# Patient Record
Sex: FEMALE | Race: WHITE | NOT HISPANIC OR LATINO | URBAN - METROPOLITAN AREA
[De-identification: names, ages, dates, MRNs, and addresses within clinical notes are randomized per-mention and may not be internally consistent; named-entity substitution may affect disease eponyms.]

---

## 2019-02-16 ENCOUNTER — EMERGENCY (EMERGENCY)
Facility: HOSPITAL | Age: 39
LOS: 0 days | Discharge: HOME | End: 2019-02-16
Attending: EMERGENCY MEDICINE
Payer: COMMERCIAL

## 2019-02-16 VITALS — WEIGHT: 130.07 LBS | HEIGHT: 65 IN

## 2019-02-16 VITALS — HEART RATE: 97 BPM

## 2019-02-16 DIAGNOSIS — M65.4 RADIAL STYLOID TENOSYNOVITIS [DE QUERVAIN]: ICD-10-CM

## 2019-02-16 DIAGNOSIS — M25.532 PAIN IN LEFT WRIST: ICD-10-CM

## 2019-02-16 PROCEDURE — 99283 EMERGENCY DEPT VISIT LOW MDM: CPT

## 2019-02-16 RX ORDER — IBUPROFEN 200 MG
600 TABLET ORAL ONCE
Qty: 0 | Refills: 0 | Status: COMPLETED | OUTPATIENT
Start: 2019-02-16 | End: 2019-02-16

## 2019-02-16 RX ADMIN — Medication 600 MILLIGRAM(S): at 16:38

## 2019-02-16 RX ADMIN — Medication 50 MILLIGRAM(S): at 16:38

## 2019-02-16 NOTE — ED STATDOCS - PROGRESS NOTE DETAILS
signed Michelle Sauer PA-C Pt seen initially in intake by Dr Story. signed Michelle Sauer PA-C Pt seen initially in intake by Dr Story.  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 + FInkelstein test. 2+ radial pluse. Gross motor/sensation intact. LIkely dequervain tenosynovitis. Pt has velcro wrist brace already. WIll send rx for prednisone, recommend f.u with hand [facility]in this week. Pt feeling well, pt and family agree with DC and plan of care. NWB.

## 2019-02-16 NOTE — ED STATDOCS - CLINICAL SUMMARY MEDICAL DECISION MAKING FREE TEXT BOX
likely de quervain tenosynovitis x 1 day. outpt f/u hand/Ellstein, rx prednisone, velcro wrist brace.

## 2019-02-16 NOTE — ED STATDOCS - OBJECTIVE STATEMENT
37 y/o female with a PMHx of neuropathy, hereditary presents to the ED c/o progressively worsening left wrist pain and swelling since yesterday. Pt reports she has h/o hereditary neuropathy. Woke up yesterday with pain and swelling. Worse with movement of left wrist. Denies any trauma. Denies fever, chills, redness to site. No other acute complaints at this time. No medications.

## 2019-02-16 NOTE — ED ADULT NURSE NOTE - NSIMPLEMENTINTERV_GEN_ALL_ED
Implemented All Universal Safety Interventions:  [place] to call system. Call bell, personal items and telephone within reach. Instruct patient to call for assistance. Room bathroom lighting operational. Non-slip footwear when patient is off stretcher. Physically safe environment: no spills, clutter or unnecessary equipment. Stretcher in lowest position, wheels locked, appropriate side rails in place.

## 2019-02-16 NOTE — ED STATDOCS - MUSCULOSKELETAL, MLM
+Mild left wrist edema with no overlying erythema. +Positive Finkelstein's test of left wrist. No bony tenderness. Nml ROM.

## 2019-12-16 PROBLEM — G62.9 POLYNEUROPATHY, UNSPECIFIED: Chronic | Status: ACTIVE | Noted: 2019-02-16

## 2019-12-23 ENCOUNTER — APPOINTMENT (OUTPATIENT)
Dept: OTOLARYNGOLOGY | Facility: CLINIC | Age: 39
End: 2019-12-23
Payer: COMMERCIAL

## 2019-12-23 VITALS — HEIGHT: 64 IN | WEIGHT: 129 LBS | BODY MASS INDEX: 22.02 KG/M2

## 2019-12-23 DIAGNOSIS — J30.9 ALLERGIC RHINITIS, UNSPECIFIED: ICD-10-CM

## 2019-12-23 DIAGNOSIS — J32.2 CHRONIC ETHMOIDAL SINUSITIS: ICD-10-CM

## 2019-12-23 PROCEDURE — 99244 OFF/OP CNSLTJ NEW/EST MOD 40: CPT | Mod: 25

## 2019-12-23 PROCEDURE — 31231 NASAL ENDOSCOPY DX: CPT

## 2019-12-23 NOTE — REVIEW OF SYSTEMS
[Sneezing] : sneezing [Problem Snoring] : problem snoring [Nasal Congestion] : nasal congestion [Sinus Pain] : sinus pain [Sinus Pressure] : sinus pressure [Swelling Neck] : swelling neck [Snoring With Pauses] : snoring with pauses [Swelling Face] : face swelling [Eye Pain] : eye pain [Cough] : cough [Eyes Itch] : itching of the eyes [Heartburn] : heartburn [Joint Pain] : joint pain [Muscle Weakness] : muscle weakness [Anxiety] : anxiety [Negative] : Heme/Lymph [Patient Intake Form Reviewed] : Patient intake form was reviewed [FreeTextEntry1] : noisy breathing, daytime sleepiness,

## 2019-12-23 NOTE — ASSESSMENT
[FreeTextEntry1] : persistent rt periorbital and cheek pressure \par rt nasal obstruction\par ?sinusitis\par cefdinir 300 bid #20\par fluticasone spray\par consider ct sinuses if symptoms do not resolve

## 2019-12-23 NOTE — PROCEDURE
[FreeTextEntry6] : Indication for procedure:  Unable to adequately examine mid and posterior nasal cavity with anterior rhinoscopy\par Sinus endoscope # 1\par Nasal septum exam shows septal deviation to the rt 2-3 plus\par The inferior nasal turbinates are moderately hypertrophic in size bilaterally.\par The sinus endoscope was introduced into the right nares\par exam right middle meatus reveals no mucopus or inflammation.  The right middle turbinate is WNL.\par The scope was advanced and the sphenoethmoid region was inspected.\par The superior meatus, superior turbinate and nasal vault are unremarkable.  The nasopharynx is unremarkable without inflammation or mass.\par The sinus endoscope was introduced into the left nares and\par exam left middle meatus reveals no mucopus or inflammation.  The left middle turbinate is WNL.\par The scope was advanced and the sphenoethmoid region was inspected.\par The left superior meatus and nasal vault are unremarkable.\par

## 2019-12-23 NOTE — CONSULT LETTER
[Sincerely,] : Sincerely, [FreeTextEntry3] : Lucien Almaguer MD, FACS\par  [FreeTextEntry1] : Dear Dr. KATIE CMCARTHY,\par \par Thank you for your kind referral. Please refer to my enclosed office notes for ALEKS RUTLEDGE . If there are any questions free to contact me.\par

## 2020-06-03 ENCOUNTER — OUTPATIENT (OUTPATIENT)
Dept: OUTPATIENT SERVICES | Facility: HOSPITAL | Age: 40
LOS: 1 days | End: 2020-06-03
Payer: COMMERCIAL

## 2020-06-03 ENCOUNTER — APPOINTMENT (OUTPATIENT)
Dept: CT IMAGING | Facility: CLINIC | Age: 40
End: 2020-06-03
Payer: COMMERCIAL

## 2020-06-03 DIAGNOSIS — Z00.8 ENCOUNTER FOR OTHER GENERAL EXAMINATION: ICD-10-CM

## 2020-06-03 PROCEDURE — 70486 CT MAXILLOFACIAL W/O DYE: CPT | Mod: 26

## 2020-06-03 PROCEDURE — 70486 CT MAXILLOFACIAL W/O DYE: CPT

## 2021-12-13 ENCOUNTER — APPOINTMENT (OUTPATIENT)
Dept: FAMILY MEDICINE | Facility: CLINIC | Age: 41
End: 2021-12-13
Payer: COMMERCIAL

## 2021-12-13 VITALS — BODY MASS INDEX: 20.14 KG/M2 | WEIGHT: 118 LBS | HEIGHT: 64 IN

## 2021-12-13 DIAGNOSIS — Z78.9 OTHER SPECIFIED HEALTH STATUS: ICD-10-CM

## 2021-12-13 DIAGNOSIS — Z82.49 FAMILY HISTORY OF ISCHEMIC HEART DISEASE AND OTHER DISEASES OF THE CIRCULATORY SYSTEM: ICD-10-CM

## 2021-12-13 DIAGNOSIS — Z80.0 FAMILY HISTORY OF MALIGNANT NEOPLASM OF DIGESTIVE ORGANS: ICD-10-CM

## 2021-12-13 DIAGNOSIS — L70.0 ACNE VULGARIS: ICD-10-CM

## 2021-12-13 DIAGNOSIS — K29.70 GASTRITIS, UNSPECIFIED, W/OUT BLEEDING: ICD-10-CM

## 2021-12-13 DIAGNOSIS — L30.9 DERMATITIS, UNSPECIFIED: ICD-10-CM

## 2021-12-13 DIAGNOSIS — H04.129 DRY EYE SYNDROME OF UNSPECIFIED LACRIMAL GLAND: ICD-10-CM

## 2021-12-13 DIAGNOSIS — K21.9 GASTRO-ESOPHAGEAL REFLUX DISEASE W/OUT ESOPHAGITIS: ICD-10-CM

## 2021-12-13 DIAGNOSIS — G56.03 CARPAL TUNNEL SYNDROM,BILATERAL UPPER LIMBS: ICD-10-CM

## 2021-12-13 DIAGNOSIS — D50.9 IRON DEFICIENCY ANEMIA, UNSPECIFIED: ICD-10-CM

## 2021-12-13 DIAGNOSIS — Z82.3 FAMILY HISTORY OF STROKE: ICD-10-CM

## 2021-12-13 DIAGNOSIS — Z80.42 FAMILY HISTORY OF MALIGNANT NEOPLASM OF PROSTATE: ICD-10-CM

## 2021-12-13 DIAGNOSIS — H18.9 UNSPECIFIED DISORDER OF CORNEA: ICD-10-CM

## 2021-12-13 DIAGNOSIS — Z00.00 ENCOUNTER FOR GENERAL ADULT MEDICAL EXAMINATION W/OUT ABNORMAL FINDINGS: ICD-10-CM

## 2021-12-13 DIAGNOSIS — M94.0 CHONDROCOSTAL JUNCTION SYNDROME [TIETZE]: ICD-10-CM

## 2021-12-13 DIAGNOSIS — Z80.1 FAMILY HISTORY OF MALIGNANT NEOPLASM OF TRACHEA, BRONCHUS AND LUNG: ICD-10-CM

## 2021-12-13 PROCEDURE — 99385 PREV VISIT NEW AGE 18-39: CPT

## 2021-12-13 RX ORDER — ASCORBIC ACID 1000 MG
10 TABLET ORAL
Refills: 0 | Status: ACTIVE | COMMUNITY
Start: 2021-12-13

## 2021-12-13 RX ORDER — CEFDINIR 300 MG/1
300 CAPSULE ORAL TWICE DAILY
Qty: 40 | Refills: 1 | Status: COMPLETED | COMMUNITY
Start: 2019-12-23 | End: 2021-12-13

## 2021-12-13 RX ORDER — AZELASTINE HYDROCHLORIDE 137 UG/1
0.1 SPRAY, METERED NASAL TWICE DAILY
Qty: 1 | Refills: 5 | Status: COMPLETED | COMMUNITY
Start: 2020-06-05 | End: 2021-12-13

## 2021-12-13 RX ORDER — FLUTICASONE PROPIONATE 50 UG/1
50 SPRAY, METERED NASAL DAILY
Qty: 3 | Refills: 3 | Status: COMPLETED | COMMUNITY
Start: 2019-12-23 | End: 2021-12-13

## 2021-12-13 RX ORDER — ZINC OXIDE 13 %
CREAM (GRAM) TOPICAL
Refills: 0 | Status: ACTIVE | COMMUNITY
Start: 2021-12-13

## 2021-12-13 RX ORDER — MULTIVITAMIN
TABLET ORAL
Refills: 0 | Status: ACTIVE | COMMUNITY
Start: 2021-12-13

## 2021-12-13 RX ORDER — TRETINOIN 0.5 MG/G
0.05 CREAM TOPICAL
Refills: 0 | Status: ACTIVE | COMMUNITY
Start: 2021-12-13

## 2021-12-13 RX ORDER — CLINDAMYCIN PHOSPHATE 10 MG/ML
1 LOTION TOPICAL
Refills: 0 | Status: ACTIVE | COMMUNITY
Start: 2021-12-13

## 2021-12-30 ENCOUNTER — NON-APPOINTMENT (OUTPATIENT)
Age: 41
End: 2021-12-30

## 2021-12-30 DIAGNOSIS — Z11.59 ENCOUNTER FOR SCREENING FOR OTHER VIRAL DISEASES: ICD-10-CM

## 2022-01-01 ENCOUNTER — NON-APPOINTMENT (OUTPATIENT)
Age: 42
End: 2022-01-01

## 2022-01-02 LAB — SARS-COV-2 N GENE NPH QL NAA+PROBE: NOT DETECTED

## 2022-02-10 LAB
25(OH)D3 SERPL-MCNC: 29.2 NG/ML
ALBUMIN SERPL ELPH-MCNC: 5 G/DL
ALP BLD-CCNC: 68 U/L
ALT SERPL-CCNC: 18 U/L
ANION GAP SERPL CALC-SCNC: 13 MMOL/L
AST SERPL-CCNC: 17 U/L
BASOPHILS # BLD AUTO: 0.06 K/UL
BASOPHILS NFR BLD AUTO: 1.3 %
BILIRUB SERPL-MCNC: 0.4 MG/DL
BUN SERPL-MCNC: 16 MG/DL
CALCIUM SERPL-MCNC: 9.4 MG/DL
CHLORIDE SERPL-SCNC: 102 MMOL/L
CHOLEST SERPL-MCNC: 223 MG/DL
CO2 SERPL-SCNC: 24 MMOL/L
CREAT SERPL-MCNC: 0.7 MG/DL
EOSINOPHIL # BLD AUTO: 0.12 K/UL
EOSINOPHIL NFR BLD AUTO: 2.6 %
ESTIMATED AVERAGE GLUCOSE: 105 MG/DL
GLUCOSE SERPL-MCNC: 80 MG/DL
HBA1C MFR BLD HPLC: 5.3 %
HCT VFR BLD CALC: 41.2 %
HDLC SERPL-MCNC: 89 MG/DL
HGB BLD-MCNC: 13.1 G/DL
IMM GRANULOCYTES NFR BLD AUTO: 0.2 %
LDLC SERPL CALC-MCNC: 120 MG/DL
LYMPHOCYTES # BLD AUTO: 1.41 K/UL
LYMPHOCYTES NFR BLD AUTO: 30.1 %
MAN DIFF?: NORMAL
MCHC RBC-ENTMCNC: 27.6 PG
MCHC RBC-ENTMCNC: 31.8 GM/DL
MCV RBC AUTO: 86.9 FL
MONOCYTES # BLD AUTO: 0.39 K/UL
MONOCYTES NFR BLD AUTO: 8.3 %
NEUTROPHILS # BLD AUTO: 2.69 K/UL
NEUTROPHILS NFR BLD AUTO: 57.5 %
NONHDLC SERPL-MCNC: 134 MG/DL
PLATELET # BLD AUTO: 229 K/UL
POTASSIUM SERPL-SCNC: 4.6 MMOL/L
PROT SERPL-MCNC: 6.6 G/DL
RBC # BLD: 4.74 M/UL
RBC # FLD: 16.2 %
SODIUM SERPL-SCNC: 139 MMOL/L
TRIGL SERPL-MCNC: 72 MG/DL
TSH SERPL-ACNC: 0.89 UIU/ML
WBC # FLD AUTO: 4.68 K/UL

## 2022-04-11 PROBLEM — Z11.59 SCREENING FOR VIRAL DISEASE: Status: ACTIVE | Noted: 2021-12-30

## 2022-04-19 ENCOUNTER — APPOINTMENT (OUTPATIENT)
Dept: FAMILY MEDICINE | Facility: CLINIC | Age: 42
End: 2022-04-19
Payer: COMMERCIAL

## 2022-04-19 ENCOUNTER — NON-APPOINTMENT (OUTPATIENT)
Age: 42
End: 2022-04-19

## 2022-04-19 VITALS
HEART RATE: 94 BPM | RESPIRATION RATE: 14 BRPM | TEMPERATURE: 99.3 F | OXYGEN SATURATION: 100 % | HEIGHT: 64 IN | BODY MASS INDEX: 20.83 KG/M2 | WEIGHT: 122 LBS

## 2022-04-19 DIAGNOSIS — L65.9 NONSCARRING HAIR LOSS, UNSPECIFIED: ICD-10-CM

## 2022-04-19 DIAGNOSIS — Z23 ENCOUNTER FOR IMMUNIZATION: ICD-10-CM

## 2022-04-19 DIAGNOSIS — G60.8 OTHER HEREDITARY AND IDIOPATHIC NEUROPATHIES: ICD-10-CM

## 2022-04-19 PROCEDURE — 90715 TDAP VACCINE 7 YRS/> IM: CPT

## 2022-04-19 PROCEDURE — 90471 IMMUNIZATION ADMIN: CPT

## 2022-04-19 PROCEDURE — 99213 OFFICE O/P EST LOW 20 MIN: CPT | Mod: 25

## 2022-06-24 ENCOUNTER — APPOINTMENT (OUTPATIENT)
Dept: OTOLARYNGOLOGY | Facility: CLINIC | Age: 42
End: 2022-06-24

## 2022-06-24 VITALS — TEMPERATURE: 97.9 F | WEIGHT: 120 LBS | HEIGHT: 64 IN | BODY MASS INDEX: 20.49 KG/M2

## 2022-06-24 DIAGNOSIS — G50.1 ATYPICAL FACIAL PAIN: ICD-10-CM

## 2022-06-24 DIAGNOSIS — J34.2 DEVIATED NASAL SEPTUM: ICD-10-CM

## 2022-06-24 DIAGNOSIS — M26.609 UNSPECIFIED TEMPOROMANDIBULAR JOINT DISORDER: ICD-10-CM

## 2022-06-24 DIAGNOSIS — H60.311 DIFFUSE OTITIS EXTERNA, RIGHT EAR: ICD-10-CM

## 2022-06-24 PROCEDURE — 99213 OFFICE O/P EST LOW 20 MIN: CPT

## 2022-06-24 RX ORDER — CLINDAMYCIN PHOSPHATE AND BENZOYL PEROXIDE 10; 50 MG/G; MG/G
1.2-5 GEL TOPICAL
Qty: 45 | Refills: 0 | Status: ACTIVE | COMMUNITY
Start: 2022-05-20

## 2022-06-24 NOTE — PHYSICAL EXAM
[] : septum deviated to the right [Midline] : trachea located in midline position [Normal] : no rashes [de-identified] : mild erythema ad canal

## 2022-06-24 NOTE — ASSESSMENT
[FreeTextEntry1] : tmj-rec heat soft diet NSAIDS\par mild rt otitis externa\par ofloxin gtts\par deviated septum-reviewed possible surgery\par dental fu for tmj

## 2022-06-24 NOTE — REVIEW OF SYSTEMS
[Ear Pain] : ear pain [Negative] : Heme/Lymph [Patient Intake Form Reviewed] : Patient intake form was reviewed [de-identified] : pressure

## 2022-06-24 NOTE — REASON FOR VISIT
[Subsequent Evaluation] : a subsequent evaluation for [Ear Pain] : ear pain [Hearing Loss] : hearing loss [FreeTextEntry2] : ears

## 2022-06-24 NOTE — HISTORY OF PRESENT ILLNESS
[de-identified] : rt sided ea aches lasting several days past 6 mo intermittent\par but last 6 weeks more frequent\par pressure and ache\par may be worse w chewing, teeth grinding\par co decreased hearing gradual\par co nasal congestion, seasonal allergies, hx deviated septum\par used fluticasone in past not helping

## 2022-07-09 ENCOUNTER — NON-APPOINTMENT (OUTPATIENT)
Age: 42
End: 2022-07-09

## 2022-09-19 ENCOUNTER — NON-APPOINTMENT (OUTPATIENT)
Age: 42
End: 2022-09-19

## 2022-11-15 ENCOUNTER — RESULT REVIEW (OUTPATIENT)
Age: 42
End: 2022-11-15

## 2022-12-29 ENCOUNTER — NON-APPOINTMENT (OUTPATIENT)
Age: 42
End: 2022-12-29

## 2023-07-10 ENCOUNTER — APPOINTMENT (OUTPATIENT)
Dept: FAMILY MEDICINE | Facility: CLINIC | Age: 43
End: 2023-07-10
Payer: COMMERCIAL

## 2023-07-10 VITALS — HEART RATE: 80 BPM | OXYGEN SATURATION: 98 %

## 2023-07-10 VITALS — TEMPERATURE: 98 F | WEIGHT: 120 LBS | HEIGHT: 64 IN | BODY MASS INDEX: 20.49 KG/M2 | RESPIRATION RATE: 16 BRPM

## 2023-07-10 DIAGNOSIS — Z83.438 FAMILY HISTORY OF OTHER DISORDER OF LIPOPROTEIN METABOLISM AND OTHER LIPIDEMIA: ICD-10-CM

## 2023-07-10 DIAGNOSIS — Z80.3 FAMILY HISTORY OF MALIGNANT NEOPLASM OF BREAST: ICD-10-CM

## 2023-07-10 DIAGNOSIS — R53.83 OTHER FATIGUE: ICD-10-CM

## 2023-07-10 PROCEDURE — 99214 OFFICE O/P EST MOD 30 MIN: CPT

## 2023-07-10 RX ORDER — CYCLOSPORINE 0.5 MG/ML
0.05 EMULSION OPHTHALMIC
Refills: 0 | Status: COMPLETED | COMMUNITY
Start: 2021-12-13 | End: 2023-07-10

## 2023-07-10 RX ORDER — OFLOXACIN OTIC 3 MG/ML
0.3 SOLUTION AURICULAR (OTIC) TWICE DAILY
Qty: 1 | Refills: 1 | Status: COMPLETED | COMMUNITY
Start: 2022-06-30 | End: 2023-07-10

## 2023-07-10 RX ORDER — CLOBETASOL PROPIONATE 0.5 MG/G
0.05 CREAM TOPICAL
Qty: 15 | Refills: 0 | Status: COMPLETED | COMMUNITY
Start: 2022-04-25 | End: 2023-07-10

## 2023-07-10 RX ORDER — GAUZE BANDAGE 4" X 4"
1000 BANDAGE TOPICAL
Refills: 0 | Status: ACTIVE | COMMUNITY
Start: 2023-07-10

## 2023-07-10 RX ORDER — NEOMYCIN AND POLYMYXIN B SULFATES AND HYDROCORTISONE OTIC 10; 3.5; 1 MG/ML; MG/ML; [USP'U]/ML
3.5-10000-1 SUSPENSION AURICULAR (OTIC)
Qty: 1 | Refills: 2 | Status: COMPLETED | COMMUNITY
Start: 2022-06-30 | End: 2023-07-10

## 2023-07-10 RX ORDER — LANSOPRAZOLE 30 MG/1
30 CAPSULE, DELAYED RELEASE ORAL DAILY
Refills: 0 | Status: ACTIVE | COMMUNITY
Start: 2021-12-13

## 2023-07-10 RX ORDER — ACETIC ACID 20 MG/ML
2 SOLUTION AURICULAR (OTIC) 3 TIMES DAILY
Qty: 1 | Refills: 2 | Status: COMPLETED | COMMUNITY
Start: 2022-06-24 | End: 2023-07-10

## 2023-07-10 RX ORDER — UBIDECARENONE/VIT E ACET 100MG-5
50 MCG CAPSULE ORAL
Refills: 0 | Status: ACTIVE | COMMUNITY
Start: 2021-12-13

## 2023-07-10 RX ORDER — FAMOTIDINE 40 MG/1
40 TABLET, FILM COATED ORAL
Refills: 0 | Status: COMPLETED | COMMUNITY
Start: 2021-12-13 | End: 2023-07-10

## 2023-07-10 RX ORDER — CALCIUM CARBONATE/VITAMIN D3 600 MG-10
100 TABLET ORAL
Refills: 0 | Status: ACTIVE | COMMUNITY
Start: 2023-07-10

## 2023-07-10 NOTE — ASSESSMENT
[FreeTextEntry1] : 42 yo F PMH  HNPP, Vitamin D def, Vitamin B1 def, costochondritis, gastritis, GERD  presenting today for fatigue.

## 2023-07-10 NOTE — REVIEW OF SYSTEMS
[Fever] : no fever [Chills] : no chills [Fatigue] : fatigue [Recent Change In Weight] : ~T no recent weight change [Suicidal] : not suicidal [Depression] : no depression [Negative] : Genitourinary

## 2023-07-10 NOTE — PLAN
[FreeTextEntry1] : -discussed medical history \par -in terms of HCM, reports has script for mammo from GYN, up to date w/ pap smear, does skin checks w/ derm \par -follow up blood work \par -follow-up with sleep specialist \par

## 2023-07-10 NOTE — HISTORY OF PRESENT ILLNESS
[FreeTextEntry1] : fatigue  [de-identified] : 44 yo F PMH  HNPP, Vitamin D def, Vitamin B1 def, costochondritis, gastritis, GERD  presenting today for fatigue. \par She was following w/ NP Nathaly Grace. \par She follows w/ Neuro, will be establishing care w/ (Dr. Constanza Barksdale), cardio (in [place] - follows for costochondritis), GYN (Dr. Prince Melanie Marie), GI (Dr. Madsen), ENT (Dr. Almaguer), Optho (Dr. Lino). \par For the past 6-7 months she has been feeling fatigued. She describes it as body exhaustion . She does not wake up in the AM feeling refreshed.  When asked if she snores at night time, states shes been told that she does not, but breathes heavy, she reports ocasionally waking herself up due to breathing heavy at night. \par SHe also notes dry skin, thinning hair. Has been following with derm, using Rogaine, does not notice a difference. \par She had vitamins (B1, Vitamin D, B12) checked w/ her GI, now is on vitamin B1 along with her VItamin D.  \par In the past, reports did have iron def anemia but reports on last labs checked in 2022 with another provider, was normal, she does have heavy menstruation. \par \par social: lives alone, works as a , no children, never a smoker

## 2023-08-02 LAB
24R-OH-CALCIDIOL SERPL-MCNC: 67.7 PG/ML
25(OH)D3 SERPL-MCNC: 34.6 NG/ML
ALBUMIN SERPL ELPH-MCNC: 4.8 G/DL
ALP BLD-CCNC: 59 U/L
ALT SERPL-CCNC: 17 U/L
ANION GAP SERPL CALC-SCNC: 13 MMOL/L
AST SERPL-CCNC: 18 U/L
BILIRUB SERPL-MCNC: 0.6 MG/DL
BUN SERPL-MCNC: 13 MG/DL
CALCIUM SERPL-MCNC: 9.6 MG/DL
CHLORIDE SERPL-SCNC: 100 MMOL/L
CHOLEST SERPL-MCNC: 223 MG/DL
CO2 SERPL-SCNC: 24 MMOL/L
CREAT SERPL-MCNC: 0.69 MG/DL
EGFR: 112 ML/MIN/1.73M2
ESTIMATED AVERAGE GLUCOSE: 103 MG/DL
FERRITIN SERPL-MCNC: 13 NG/ML
GLUCOSE SERPL-MCNC: 89 MG/DL
HBA1C MFR BLD HPLC: 5.2 %
HDLC SERPL-MCNC: 91 MG/DL
IRON SATN MFR SERPL: 19 %
IRON SERPL-MCNC: 83 UG/DL
LDLC SERPL CALC-MCNC: 121 MG/DL
NONHDLC SERPL-MCNC: 132 MG/DL
POTASSIUM SERPL-SCNC: 4.2 MMOL/L
PROT SERPL-MCNC: 6.6 G/DL
RBC # BLD: 4.53 M/UL
RETICS # AUTO: 1.1 %
RETICS AGGREG/RBC NFR: 50.3 K/UL
SODIUM SERPL-SCNC: 138 MMOL/L
TIBC SERPL-MCNC: 434 UG/DL
TRIGL SERPL-MCNC: 63 MG/DL
TSH SERPL-ACNC: 0.73 UIU/ML
UIBC SERPL-MCNC: 352 UG/DL
VIT B1 SERPL-MCNC: 123.6 NMOL/L
VIT B12 SERPL-MCNC: 544 PG/ML

## 2024-05-21 NOTE — HISTORY OF PRESENT ILLNESS
See the follow-up oncology note dictated by the medical resident from May 21, 2024.   [de-identified] : 3 mo hx periorbital pressure\par eye eval than rt facial pressure and rt nasal obstruction\par rt facial fullness, no nasal dc, seasonal allergies\par now intermittent obstruction\par amox x 10 d w primary 4 weeks ago no help\par neg hx re sinuses\par charcot annette tooth  hnpp

## 2024-10-01 ENCOUNTER — APPOINTMENT (OUTPATIENT)
Dept: OTOLARYNGOLOGY | Facility: CLINIC | Age: 44
End: 2024-10-01
Payer: COMMERCIAL

## 2024-10-01 VITALS — HEIGHT: 64 IN | BODY MASS INDEX: 21.34 KG/M2 | WEIGHT: 125 LBS

## 2024-10-01 DIAGNOSIS — H61.23 IMPACTED CERUMEN, BILATERAL: ICD-10-CM

## 2024-10-01 DIAGNOSIS — G60.8 OTHER HEREDITARY AND IDIOPATHIC NEUROPATHIES: ICD-10-CM

## 2024-10-01 DIAGNOSIS — M26.609 UNSPECIFIED TEMPOROMANDIBULAR JOINT DISORDER: ICD-10-CM

## 2024-10-01 DIAGNOSIS — H90.3 SENSORINEURAL HEARING LOSS, BILATERAL: ICD-10-CM

## 2024-10-01 DIAGNOSIS — Z87.39 PERSONAL HISTORY OF OTHER DISEASES OF THE MUSCULOSKELETAL SYSTEM AND CONNECTIVE TISSUE: ICD-10-CM

## 2024-10-01 DIAGNOSIS — H69.91 UNSPECIFIED EUSTACHIAN TUBE DISORDER, RIGHT EAR: ICD-10-CM

## 2024-10-01 PROCEDURE — 92567 TYMPANOMETRY: CPT

## 2024-10-01 PROCEDURE — 99214 OFFICE O/P EST MOD 30 MIN: CPT

## 2024-10-01 PROCEDURE — 92555 SPEECH THRESHOLD AUDIOMETRY: CPT

## 2024-10-01 PROCEDURE — 92552 PURE TONE AUDIOMETRY AIR: CPT

## 2024-10-01 NOTE — PHYSICAL EXAM
[Midline] : trachea located in midline position [Normal] : no rashes [de-identified] : asymmetry and click [de-identified] : cerumen  cleared ad

## 2024-10-01 NOTE — HISTORY OF PRESENT ILLNESS
[de-identified] : 10/1/24: 44 year old female presents for f/u ear discomfort Hx TMJ, deviated septum, ear infections 2022, neuropathy.  Co b/l ear plugging R worse than L, gradual decreased hearing, feels like fluid inside. plugging sensation x months Occ burning, pressure for past 3 mo Denies otalgia, recent ear infections. no allergies  6/24/22:  Rt sided ea aches lasting several days past 6 mo intermittent but last 6 weeks more frequent pressure and ache may be worse w chewing, teeth grinding co decreased hearing gradual co nasal congestion, seasonal allergies, hx deviated septum used fluticasone in past not helping

## 2024-10-01 NOTE — DATA REVIEWED
[de-identified] :  Type A tymps, AU. Testing via inserts:  - 8000Hz, AU. Please note patient declined bone conduction. Recs: 1) F/u w/ MD 2) Annual

## 2024-10-01 NOTE — ASSESSMENT
[FreeTextEntry1] : cerumen cleared ad otherwise wnl tmj audio and tymp wnl rt otalgia daytime fatigue rec sleep study
